# Patient Record
Sex: FEMALE | Employment: FULL TIME | URBAN - METROPOLITAN AREA
[De-identification: names, ages, dates, MRNs, and addresses within clinical notes are randomized per-mention and may not be internally consistent; named-entity substitution may affect disease eponyms.]

---

## 2019-03-07 LAB
HBV SURFACE AG SERPL QL IA: NON REACTIVE
HIV 1+2 AB+HIV1 P24 AG SERPL QL IA: NON REACTIVE
RUBELLA ABY IGG: NORMAL
TREPONEMA ANTIBODIES: NON REACTIVE

## 2019-09-20 LAB — GROUP B STREP PCR: NEGATIVE

## 2019-10-17 ENCOUNTER — HOSPITAL ENCOUNTER (INPATIENT)
Facility: CLINIC | Age: 34
LOS: 2 days | Discharge: HOME | End: 2019-10-20
Attending: OBSTETRICS & GYNECOLOGY | Admitting: OBSTETRICS & GYNECOLOGY
Payer: COMMERCIAL

## 2019-10-17 PROBLEM — Z36.89 ENCOUNTER FOR TRIAGE IN PREGNANT PATIENT: Status: ACTIVE | Noted: 2019-10-17

## 2019-10-17 PROCEDURE — G0463 HOSPITAL OUTPT CLINIC VISIT: HCPCS

## 2019-10-17 RX ORDER — PRENATAL VIT/IRON FUM/FOLIC AC 27MG-0.8MG
1 TABLET ORAL DAILY
COMMUNITY

## 2019-10-17 RX ORDER — CHLORAL HYDRATE 500 MG
2 CAPSULE ORAL DAILY
Status: ON HOLD | COMMUNITY
End: 2019-10-20

## 2019-10-17 RX ORDER — MULTIVIT-MIN/IRON/FOLIC ACID/K 18-600-40
CAPSULE ORAL
Status: ON HOLD | COMMUNITY
End: 2019-10-20

## 2019-10-17 ASSESSMENT — MIFFLIN-ST. JEOR: SCORE: 1482.46

## 2019-10-18 LAB
ABO + RH BLD: NORMAL
ABO + RH BLD: NORMAL
SPECIMEN EXP DATE BLD: NORMAL
T PALLIDUM AB SER QL: NONREACTIVE

## 2019-10-18 PROCEDURE — 36415 COLL VENOUS BLD VENIPUNCTURE: CPT | Performed by: OBSTETRICS & GYNECOLOGY

## 2019-10-18 PROCEDURE — 25000125 ZZHC RX 250: Performed by: OBSTETRICS & GYNECOLOGY

## 2019-10-18 PROCEDURE — 25000128 H RX IP 250 OP 636: Performed by: OBSTETRICS & GYNECOLOGY

## 2019-10-18 PROCEDURE — 86900 BLOOD TYPING SEROLOGIC ABO: CPT | Performed by: OBSTETRICS & GYNECOLOGY

## 2019-10-18 PROCEDURE — 12000000 ZZH R&B MED SURG/OB

## 2019-10-18 PROCEDURE — 86901 BLOOD TYPING SEROLOGIC RH(D): CPT | Performed by: OBSTETRICS & GYNECOLOGY

## 2019-10-18 PROCEDURE — 72200001 ZZH LABOR CARE VAGINAL DELIVERY SINGLE

## 2019-10-18 PROCEDURE — 0KQM0ZZ REPAIR PERINEUM MUSCLE, OPEN APPROACH: ICD-10-PCS | Performed by: OBSTETRICS & GYNECOLOGY

## 2019-10-18 PROCEDURE — 86780 TREPONEMA PALLIDUM: CPT | Performed by: OBSTETRICS & GYNECOLOGY

## 2019-10-18 PROCEDURE — 40000084 ZZH STATISTIC IP LACTATION SERVICES 16-30 MIN

## 2019-10-18 PROCEDURE — 25000132 ZZH RX MED GY IP 250 OP 250 PS 637: Performed by: OBSTETRICS & GYNECOLOGY

## 2019-10-18 RX ORDER — BISACODYL 10 MG
10 SUPPOSITORY, RECTAL RECTAL DAILY PRN
Status: DISCONTINUED | OUTPATIENT
Start: 2019-10-20 | End: 2019-10-20 | Stop reason: HOSPADM

## 2019-10-18 RX ORDER — OXYTOCIN/0.9 % SODIUM CHLORIDE 30/500 ML
100 PLASTIC BAG, INJECTION (ML) INTRAVENOUS CONTINUOUS
Status: DISCONTINUED | OUTPATIENT
Start: 2019-10-18 | End: 2019-10-20 | Stop reason: HOSPADM

## 2019-10-18 RX ORDER — ACETAMINOPHEN 325 MG/1
650 TABLET ORAL EVERY 4 HOURS PRN
Status: DISCONTINUED | OUTPATIENT
Start: 2019-10-18 | End: 2019-10-20 | Stop reason: HOSPADM

## 2019-10-18 RX ORDER — OXYTOCIN 10 [USP'U]/ML
10 INJECTION, SOLUTION INTRAMUSCULAR; INTRAVENOUS
Status: COMPLETED | OUTPATIENT
Start: 2019-10-18 | End: 2019-10-18

## 2019-10-18 RX ORDER — METHYLERGONOVINE MALEATE 0.2 MG/ML
200 INJECTION INTRAVENOUS
Status: COMPLETED | OUTPATIENT
Start: 2019-10-18 | End: 2019-10-18

## 2019-10-18 RX ORDER — FENTANYL CITRATE 50 UG/ML
50-100 INJECTION, SOLUTION INTRAMUSCULAR; INTRAVENOUS
Status: DISCONTINUED | OUTPATIENT
Start: 2019-10-18 | End: 2019-10-18

## 2019-10-18 RX ORDER — HYDROCORTISONE 2.5 %
CREAM (GRAM) TOPICAL 3 TIMES DAILY PRN
Status: DISCONTINUED | OUTPATIENT
Start: 2019-10-18 | End: 2019-10-20 | Stop reason: HOSPADM

## 2019-10-18 RX ORDER — OXYCODONE AND ACETAMINOPHEN 5; 325 MG/1; MG/1
1 TABLET ORAL
Status: DISCONTINUED | OUTPATIENT
Start: 2019-10-18 | End: 2019-10-18

## 2019-10-18 RX ORDER — LANOLIN 100 %
OINTMENT (GRAM) TOPICAL
Status: DISCONTINUED | OUTPATIENT
Start: 2019-10-18 | End: 2019-10-20 | Stop reason: HOSPADM

## 2019-10-18 RX ORDER — ONDANSETRON 2 MG/ML
4 INJECTION INTRAMUSCULAR; INTRAVENOUS EVERY 6 HOURS PRN
Status: DISCONTINUED | OUTPATIENT
Start: 2019-10-18 | End: 2019-10-18

## 2019-10-18 RX ORDER — IBUPROFEN 800 MG/1
800 TABLET, FILM COATED ORAL
Status: COMPLETED | OUTPATIENT
Start: 2019-10-18 | End: 2019-10-18

## 2019-10-18 RX ORDER — CARBOPROST TROMETHAMINE 250 UG/ML
250 INJECTION, SOLUTION INTRAMUSCULAR
Status: DISCONTINUED | OUTPATIENT
Start: 2019-10-18 | End: 2019-10-18

## 2019-10-18 RX ORDER — AMOXICILLIN 250 MG
2 CAPSULE ORAL 2 TIMES DAILY
Status: DISCONTINUED | OUTPATIENT
Start: 2019-10-18 | End: 2019-10-20 | Stop reason: HOSPADM

## 2019-10-18 RX ORDER — OXYTOCIN/0.9 % SODIUM CHLORIDE 30/500 ML
100-340 PLASTIC BAG, INJECTION (ML) INTRAVENOUS CONTINUOUS PRN
Status: DISCONTINUED | OUTPATIENT
Start: 2019-10-18 | End: 2019-10-18

## 2019-10-18 RX ORDER — NALOXONE HYDROCHLORIDE 0.4 MG/ML
.1-.4 INJECTION, SOLUTION INTRAMUSCULAR; INTRAVENOUS; SUBCUTANEOUS
Status: DISCONTINUED | OUTPATIENT
Start: 2019-10-18 | End: 2019-10-20 | Stop reason: HOSPADM

## 2019-10-18 RX ORDER — OXYTOCIN 10 [USP'U]/ML
10 INJECTION, SOLUTION INTRAMUSCULAR; INTRAVENOUS
Status: DISCONTINUED | OUTPATIENT
Start: 2019-10-18 | End: 2019-10-20 | Stop reason: HOSPADM

## 2019-10-18 RX ORDER — OXYTOCIN/0.9 % SODIUM CHLORIDE 30/500 ML
340 PLASTIC BAG, INJECTION (ML) INTRAVENOUS CONTINUOUS PRN
Status: DISCONTINUED | OUTPATIENT
Start: 2019-10-18 | End: 2019-10-20 | Stop reason: HOSPADM

## 2019-10-18 RX ORDER — IBUPROFEN 800 MG/1
800 TABLET, FILM COATED ORAL EVERY 6 HOURS PRN
Status: DISCONTINUED | OUTPATIENT
Start: 2019-10-18 | End: 2019-10-20 | Stop reason: HOSPADM

## 2019-10-18 RX ORDER — MISOPROSTOL 200 UG/1
TABLET ORAL
Status: DISCONTINUED
Start: 2019-10-18 | End: 2019-10-18 | Stop reason: HOSPADM

## 2019-10-18 RX ORDER — SODIUM CHLORIDE, SODIUM LACTATE, POTASSIUM CHLORIDE, CALCIUM CHLORIDE 600; 310; 30; 20 MG/100ML; MG/100ML; MG/100ML; MG/100ML
INJECTION, SOLUTION INTRAVENOUS CONTINUOUS
Status: DISCONTINUED | OUTPATIENT
Start: 2019-10-18 | End: 2019-10-18

## 2019-10-18 RX ORDER — AMOXICILLIN 250 MG
1 CAPSULE ORAL 2 TIMES DAILY
Status: DISCONTINUED | OUTPATIENT
Start: 2019-10-18 | End: 2019-10-20 | Stop reason: HOSPADM

## 2019-10-18 RX ORDER — ACETAMINOPHEN 325 MG/1
650 TABLET ORAL EVERY 4 HOURS PRN
Status: DISCONTINUED | OUTPATIENT
Start: 2019-10-18 | End: 2019-10-18

## 2019-10-18 RX ORDER — PRENATAL VIT/IRON FUM/FOLIC AC 27MG-0.8MG
1 TABLET ORAL DAILY
Status: DISCONTINUED | OUTPATIENT
Start: 2019-10-18 | End: 2019-10-20 | Stop reason: HOSPADM

## 2019-10-18 RX ORDER — NALOXONE HYDROCHLORIDE 0.4 MG/ML
.1-.4 INJECTION, SOLUTION INTRAMUSCULAR; INTRAVENOUS; SUBCUTANEOUS
Status: DISCONTINUED | OUTPATIENT
Start: 2019-10-18 | End: 2019-10-18

## 2019-10-18 RX ADMIN — LIDOCAINE HYDROCHLORIDE 15 ML: 10 INJECTION, SOLUTION EPIDURAL; INFILTRATION; INTRACAUDAL; PERINEURAL at 02:16

## 2019-10-18 RX ADMIN — IBUPROFEN 800 MG: 800 TABLET ORAL at 16:55

## 2019-10-18 RX ADMIN — IBUPROFEN 800 MG: 800 TABLET ORAL at 23:39

## 2019-10-18 RX ADMIN — METHYLERGONOVINE MALEATE 200 MCG: 0.2 INJECTION, SOLUTION INTRAMUSCULAR; INTRAVENOUS at 02:31

## 2019-10-18 RX ADMIN — OXYTOCIN 10 UNITS: 10 INJECTION, SOLUTION INTRAMUSCULAR; INTRAVENOUS at 02:14

## 2019-10-18 RX ADMIN — IBUPROFEN 800 MG: 800 TABLET ORAL at 09:52

## 2019-10-18 RX ADMIN — IBUPROFEN 800 MG: 800 TABLET ORAL at 03:56

## 2019-10-18 RX ADMIN — SENNOSIDES AND DOCUSATE SODIUM 2 TABLET: 8.6; 5 TABLET ORAL at 20:44

## 2019-10-18 RX ADMIN — SENNOSIDES AND DOCUSATE SODIUM 1 TABLET: 8.6; 5 TABLET ORAL at 09:52

## 2019-10-18 NOTE — PLAN OF CARE
Data: Vital signs within normal limits. Postpartum checks within normal limits - see flow record. Patient eating and drinking normally. Patient able to empty bladder independently and is up ambulating. No apparent signs of infection. Patient performing self cares, is able to care for infant and is breastfeeding every 2-3 hours.   Is using ibuprofen for generalized discomfort and cramping, c/o some soreness at stitches site, no bruising or redness noted.  Rested in bed this shift.  Action: Patient medicated during the shift for cramping. See MAR. Patient reassessed within 1 hour after each medication and pain was improved - patient stated she was comfortable. Patient education done, see flow record.  Response: Positive attachment behaviors observed with infant. Patient's support present this shift.   Plan: Continue current plan of care.  Anticipate discharge on 10/20/19.

## 2019-10-18 NOTE — L&D DELIVERY NOTE
OB Vaginal Delivery Note    Hilary May MRN# [id number]   Age: 33 year old YOB: 1985       GA: 40w1d  GP:   Labor Complications: None   Delivery QBL: 307 mL  Delivery Type: Vaginal, Spontaneous   ROM to Delivery Time: (Delivered) Minutes: 19   Weight:      1 Minute 5 Minute 10 Minute   Apgar Totals: 9    9          KORTNEY ACEVES;BARDAI, ROZINA R;ALVIN RENEE     Delivery Details:  Hilary May, a 33 year old  female delivered a viable infant with apgars of 9   and 9  . Patient was fully dilated and pushing after 4  hours 45  minutes in active labor. Delivery was via vaginal, spontaneous  to a sterile field under local  anesthesia. Infant delivered in vertex  right  occiput  anterior  position. Anterior and posterior shoulders delivered without difficulty. The cord was clamped, cut twice and 3 vessels were noted. Cord blood was obtained in routine fashion with the following disposition: lab .      Cord complications: none   Placenta delivered at 10/18/2019  2:14 AM . Placental disposition was Hospital disposal . Fundal massage performed and fundus found to be firm.     Episiotomy: none    Perineum, vagina, cervix were inspected, and the following lacerations were noted:   Perineal lacerations: 2nd . The rectal sphincter capsule was exposure but rectal examination and careful inspection revealed that there was no disruption of this and therefore no third degree laceration was present. The tissue overlying the capsule was reinforced with two figure of X sutures of 2-0 Vicryl. The 2nd degree laceration was then repaired in usual fashion using 3-0 Vicryl.    Patient did have some brisk bleeding during the repair with some uterine atony noted and clot in the lower uterine segment during bimanual exam.  Given this finding she was given a dose of IM Methergine.  Examination at completion of the repair revealed a firm uterus with a light trickle of  bleeding.    Excellent hemostasis was noted. Needle count correct. Infant and patient in delivery room in good and stable condition.        Labor Event Times    Labor onset date:  10/17/19 Onset time:   9:00 PM   Dilation complete date:  10/18/19 Complete time:   1:45 AM   Start pushing date/time:  10/18/2019 0150      Labor Events     labor?:  No   steroids:  None  Labor Type:  Spontaneous     Antibiotics received during labor?:  No     Rupture date/time: 10/18/19 0149   Rupture type:  Spontaneous rupture of membranes occuring during spontaneous labor or augmentation  Fluid color:  Clear  Fluid odor:  Normal     1:1 continuous labor support provided by?:  TOMMIE soto       Delivery/Placenta Date and Time    Delivery Date:  10/18/19 Delivery Time:   2:08 AM   Placenta Date/Time:  10/18/2019  2:14 AM     Vaginal Counts     Initial count performed by 2 team members:   Two Team Members   Dr Hieronimus Rozina B       Needles Suture Fence Sponges Instruments   Initial counts 2  5    Added to count  2     Final counts       Placed during labor Accounted for at the end of labor   NA    NA    NA     Final count performed by 2 team members:   Two Team Members   DR Hieronimus Rozina B             Apgars    Living status:  Living   1 Minute 5 Minute 10 Minute 15 Minute 20 Minute   Skin color: 1  1       Heart rate: 2  2       Reflex irritability: 2  2       Muscle tone: 2  2       Respiratory effort: 2  2       Total: 9  9       Apgars assigned by:  ALVIN RENEE RN     Cord     Complications:  None   Cord Blood Disposition:  Lab Gases Sent?:  No       Resuscitation    Methods:  None       Output in Delivery Room:  Stool     Skin to Skin and Feeding Plan    Skin to skin initiation date/time: 1/10/1841    Skin to skin with:  Mother  Skin to skin end date/time:     How do you plan to feed your baby:  Breastfeeding     Labor Events and Shoulder Dystocia    Fetal Tracing Prior to Delivery:  Category  2  Shoulder dystocia present?:  Neg             Delivery (Maternal) (Provider to Complete) (138276)    Episiotomy:  None  Perineal lacerations:  2nd Repaired?:  Yes      Blood Loss  Mother: Hilary May #[id number]   Start of Mother's Information    IO Blood Loss  10/17/19 2100 - 10/18/19 0258    Delivery QBL (mL) Hospital Encounter 307 mL    Total  307 mL         End of Mother's Information  Mother: Hilary May #[id number]         Delivery - Provider to Complete ([id number])    Delivering clinician:  Marcela Ribera MD  Attempted Delivery Types (Choose all that apply):  Spontaneous Vaginal Delivery  Delivery Type (Choose the 1 that will go to the Birth History):  Vaginal, Spontaneous   Other personnel:   Provider Role   Marcela Ribera MD Obstetrician   Bardai, Rozina R, RN Delivery Nurse   Siomara Jeff, RN Delivery Nurse         Placenta    Delayed Cord Clamping:  Done  Date/Time:  10/18/2019  2:14 AM  Removal:  Spontaneous  Disposition:  Hospital disposal     Anesthesia    Method:  Local          Presentation and Position    Presentation:  Vertex  Position:  Right Occiput Anterior           Marcela Ribera MD

## 2019-10-18 NOTE — PLAN OF CARE
Data: Patient admitted to room 405 at 0130. Patient is a . Prenatal record reviewed.   OB History    Para Term  AB Living   2 1 1 0 0 1   SAB TAB Ectopic Multiple Live Births   0 0 0 0 1      # Outcome Date GA Lbr Theo/2nd Weight Sex Delivery Anes PTL Lv   2 Current            1 Term            .  Medical History:   Past Medical History:   Diagnosis Date     Depressive disorder    .  Gestational age 40w1d. Vital signs per doc flowsheet. Fetal movement present. Patient reports Rule Out Labor   as reason for admission. Support persons Shelton and Patel present.  Action:  Verbal consent for EFM, external fetal monitors applied. Admission assessment completed. Patient and support persons educated on labor process. Patient instructed to report change in fetal movement, contractions, vaginal leaking of fluid or bleeding, abdominal pain, or any concerns related to the pregnancy to her nurse/physician. Patient oriented to room, call light in reach.   Response: Dr. Ribera informed of status of labor. Plan per provider is admit and follow labor orders. Patient verbalized understanding of education and verbalized agreement with plan. Patient coping with labor via breathing/relaxation. Patient made request for possible nitrous oxide use.

## 2019-10-18 NOTE — PROVIDER NOTIFICATION
10/18/19 0114   Provider Notification   Provider Name/Title Dr. Ribera   Method of Notification Phone   Request Evaluate - Remote   Notification Reason SVE;Status Update   Md updated on SVE. Patient making cervical change. Ok to move patient to inpatient. See orders.

## 2019-10-18 NOTE — PROVIDER NOTIFICATION
10/17/19 5207   Provider Notification   Provider Name/Title Dr. Ribera   Method of Notification Phone   Request Evaluate - Remote   Notification Reason Patient Arrived;Labor Status;Uterine Activity;SVE   MD notified of patient's arrival, [facility], [facility], [facility]. OK to observe patient after ambulating to monitor labor status. Order for NST, observation status. See orders

## 2019-10-18 NOTE — PLAN OF CARE
Assumed care at 0500: VSS. Breastfeeding and tolerating well. Light amount of lochia, no clots noted. Ibuprofen and Tylenol available for pain. FOB at bedside and supportive. Bonding well with baby. Will continue to monitor and provide supportive cares.

## 2019-10-18 NOTE — H&P
[facility] Labor and Delivery History and Physical    Hilary May MRN# [id number]   Age: 33 year old YOB: 1985     Date of Admission:  10/17/2019         CC:    Contractions         HPI:   Hilary May is a 33 year old  at 40w1d by 8w0d US who presents with reports of contractions since 3:00 this afternoon.  Patient reports that the contractions became more regular around 9 PM this evening.  Patient denies significant vaginal bleeding or gushes of fluid.  She reports regular fetal movement.  Her pregnancy is complicated by history of anxiety and depression for which she is not currently taking any medication and does not have any current concerns.          Pregnancy history:     OBSTETRIC HISTORY:    OB History    Para Term  AB Living   2 1 1 0 0 1   SAB TAB Ectopic Multiple Live Births   0 0 0 0 1      # Outcome Date GA Lbr Theo/2nd Weight Sex Delivery Anes PTL Lv   2 Current            1 Term                Prenatal Labs:   Blood Type: A+  Rubella: Immune  HIV: Nonreactive  Hep B Surface Ag: Negative  Syphilis: Nonreactive x 2  GCT: Normal  Last Hgb: 12.7 on     GBS Status:   Lab Results   Component Value Date    GBS NEGATIVE 2019       Medication Prior to Admission  Medications Prior to Admission   Medication Sig Dispense Refill Last Dose     fish oil-omega-3 fatty acids 1000 MG capsule Take 2 g by mouth daily   10/16/2019 at Unknown time     Prenatal Vit-Fe Fumarate-FA (PRENATAL MULTIVITAMIN W/IRON) 27-0.8 MG tablet Take 1 tablet by mouth daily   10/16/2019 at Unknown time     Vitamin D, Cholecalciferol, 1000 units TABS    10/16/2019 at Unknown time   .        Maternal Past Medical History:     Past Medical History:   Diagnosis Date     Depressive disorder                        Family History:   History reviewed. No pertinent family history.         Social History:     Social History     Tobacco Use     Smoking status: Never Smoker      "Smokeless tobacco: Never Used   Substance Use Topics     Alcohol use: Not Currently     Drug use: Never            Review of Systems:   Negative except as noted above in the HPI         Physical Exam:     Vitals:    10/17/19 2233   BP: 113/86   Pulse: 102   Resp: 18   Temp: 98.4  F (36.9  C)   TempSrc: Oral   Weight: 73.5 kg (162 lb)   Height: 1.702 m (5' 7\")     Cardio: Regular rate and rhythm  Resp: Clear to auscultation bilaterally  Abdomen: gravid, soft, nt  Cervix: 4/80/-1 per RN exam  Presentation:Cephalic by SVE  Membranes: Intact  Fetal Heart Rate Tracin, moderate, no decelerations  Tocometer: Every 3 to 10 minutes        Assessment:   Hilary May is a 33 year old  at 40w1d by 8w0d US admitted for labor.  Pregnancy complicated by anxiety and depression.        Plan:   Labor: Spontaneous progress.  Anticipate .  Fetal status: Category 1  GBS: negative  Pain management: Comfort measures per patient preference    Ashley Hieronimus, MD [facility]  10/18/2019 3:03 AM                            "

## 2019-10-18 NOTE — PLAN OF CARE
Data: Hilray May transferred to [facility] via wheelchair at 0515. Baby transferred via parent's arms.  Action: Receiving unit notified of transfer: Yes. Patient and family notified of room change. Report given to Emy REILLY at 0520. Belongings sent to receiving unit. Accompanied by Registered Nurse. Oriented patient to surroundings. Call light within reach. ID bands double-checked with receiving RN.  Response: Patient tolerated transfer and is stable.    Patients mobililty level scored using the bedside mobility assistance tool (BMAT). Patient is at a mobility level test number: 4. Mobility equipment used: none required. Required assist of 0 staff members. Further use of BMAT scoring not required.

## 2019-10-18 NOTE — LACTATION NOTE
Lactation in to see patient. Mother states baby sleepy and having attempts. Nursed well first couple of times. Writer changed diaper and baby awake. Baby latched with swallows heard. Baby does tuck bottom lip in needing to be readjusted through out feed. All questions answered at this time. Encouraged to call for help prn.

## 2019-10-19 LAB — HGB BLD-MCNC: 12.6 G/DL (ref 11.7–15.7)

## 2019-10-19 PROCEDURE — 36415 COLL VENOUS BLD VENIPUNCTURE: CPT | Performed by: OBSTETRICS & GYNECOLOGY

## 2019-10-19 PROCEDURE — 12000000 ZZH R&B MED SURG/OB

## 2019-10-19 PROCEDURE — 25000132 ZZH RX MED GY IP 250 OP 250 PS 637: Performed by: OBSTETRICS & GYNECOLOGY

## 2019-10-19 PROCEDURE — 85018 HEMOGLOBIN: CPT | Performed by: OBSTETRICS & GYNECOLOGY

## 2019-10-19 RX ADMIN — IBUPROFEN 800 MG: 800 TABLET ORAL at 21:42

## 2019-10-19 RX ADMIN — IBUPROFEN 800 MG: 800 TABLET ORAL at 07:55

## 2019-10-19 RX ADMIN — SENNOSIDES AND DOCUSATE SODIUM 1 TABLET: 8.6; 5 TABLET ORAL at 20:26

## 2019-10-19 RX ADMIN — PRENATAL VIT W/ FE FUMARATE-FA TAB 27-0.8 MG 1 TABLET: 27-0.8 TAB at 10:25

## 2019-10-19 RX ADMIN — SENNOSIDES AND DOCUSATE SODIUM 1 TABLET: 8.6; 5 TABLET ORAL at 07:56

## 2019-10-19 NOTE — PLAN OF CARE
Pt able to get some rest between cares w/  rooming-in for part of night.  States ordered pain medications keeping her comfortable.  Independent w/ self cares.  FOB helpful w/  cares.

## 2019-10-19 NOTE — PLAN OF CARE
Patient meeting expected goals. Bonding well with . Using ibuprofen and heat for cramping. Ambulating and voiding without difficulty. Education taught to patient and patient verbalized understanding.

## 2019-10-19 NOTE — PROGRESS NOTES
"[facility] Postpartum Note    S:  Hilary May feels good this morning. Was able to sleep last night. Pain control adequate. Lochia minimal. Voiding. Breast feeding. Mood Good.     O:  Vitals were reviewed  Blood pressure 111/59, pulse 76, temperature 98  F (36.7  C), temperature source Oral, resp. rate 18, height 1.702 m (5' 7\"), weight 73.5 kg (162 lb), last menstrual period 01/10/2019, unknown if currently breastfeeding.      General: healthy, alert and no distress  Abd: soft, appropriately tender, fundus firm  Legs: Non-tender, 0+ pitting edema    RH(D)   Date Value Ref Range Status   10/18/2019 Pos  Final       Assessment and Plan:   Postpartum Day #1, status post vaginal delivery, doing well.  -- Routine care      Anu Espinoza, , DO    "

## 2019-10-19 NOTE — PLAN OF CARE
Pt bonding well with baby. Breastfeeding with good latch, some nipple tenderness reported, mothers love and hydrogel pads given. Denies headache, SOB, chest pain, dizziness, and changes in vision. Ambulating independently, voiding without difficulty. Ibuprofen for pain management. Hemorrhoids present, tucks pads in place.     Tennille Olivas RN on 10/19/2019 at 2:00 PM

## 2019-10-20 VITALS
SYSTOLIC BLOOD PRESSURE: 108 MMHG | RESPIRATION RATE: 16 BRPM | DIASTOLIC BLOOD PRESSURE: 60 MMHG | BODY MASS INDEX: 25.43 KG/M2 | WEIGHT: 162 LBS | TEMPERATURE: 97.5 F | HEIGHT: 67 IN | HEART RATE: 76 BPM

## 2019-10-20 PROCEDURE — 25000132 ZZH RX MED GY IP 250 OP 250 PS 637: Performed by: OBSTETRICS & GYNECOLOGY

## 2019-10-20 RX ADMIN — SENNOSIDES AND DOCUSATE SODIUM 2 TABLET: 8.6; 5 TABLET ORAL at 10:08

## 2019-10-20 RX ADMIN — PRENATAL VIT W/ FE FUMARATE-FA TAB 27-0.8 MG 1 TABLET: 27-0.8 TAB at 10:07

## 2019-10-20 NOTE — PROGRESS NOTES
[facility] Obstetrics Post-Partum Progress Note          Assessment and Plan:    Assessment:   Post-partum day #2  Normal spontaneous vaginal delivery  L&D complications: None      Doing well.  No excessive bleeding  Pain well-controlled.      Plan:   Discharge later today           Interval History:   Doing well.  Pain is adequately controlled.  No fevers.  No history of foul-smelling vaginal discharge.  Good appetite.  Denies chest pain, shortness of breath, nausea or vomiting.  Vaginal bleeding is similar to a heavy menstrual flow.  Breastfeeding well.          Significant Problems:    None          Review of Systems:    The patient denies any chest pain, shortness of breath, excessive pain, fever, chills, purulent drainage from the wound, nausea or vomiting.          Medications:   All medications  have been reviewed          Physical Exam:   All vitals stable  Uterine fundus is firm, non-tender and at the level of the umbilicus          Data:   All laboratory data reviewed      Jorge Christianson

## 2019-10-20 NOTE — PLAN OF CARE
All discharge instructions reviewed. Patient verbalizes understanding of instructions and all questions answered.

## 2019-10-20 NOTE — PLAN OF CARE
Vitals stable. Ibuprofen given this shift, pain well controlled. Independent with self and baby cares.  Shelton at bedside and supportive. Planning to discharge to home tomorrow.

## 2019-10-20 NOTE — PLAN OF CARE
Pt able to get some rest between cares w/  rooming-in for night.  Gel pads for nipple tenderness and tucks pads to hemorrhoid.  Independent w/ self cares; spouse assisting w/  cares.  Anticipate discharge later today.

## 2019-10-20 NOTE — DISCHARGE SUMMARY
[facility] Discharge Summary    Hilary May MRN# [id number]   Age: 33 year old YOB: 1985     Date of Admission:  10/17/2019  Date of Discharge::  10/20/2019  Admitting Physician:  Marcela Ribera MD  Discharge Physician:  Jorge Christianson     [place] [facility]: [facility]          Admission Diagnoses:    pregnancy  Encounter for triage in pregnant patient  Labor and delivery indication for care or intervention   (spontaneous vaginal delivery)          Discharge Diagnosis:   Normal spontaneous vaginal delivery  Intrauterine pregnancy at 40 weeks gestation          Procedures:   Procedure(s): No additional procedures performed                  Medications Prior to Admission:     Medications Prior to Admission   Medication Sig Dispense Refill Last Dose     fish oil-omega-3 fatty acids 1000 MG capsule Take 2 g by mouth daily   10/16/2019 at Unknown time     Prenatal Vit-Fe Fumarate-FA (PRENATAL MULTIVITAMIN W/IRON) 27-0.8 MG tablet Take 1 tablet by mouth daily   10/16/2019 at Unknown time     Vitamin D, Cholecalciferol, 1000 units TABS    10/16/2019 at Unknown time             Discharge Medications:   No medications were prescribed on discharge          Consultations:   No consultations were requested during this admission          Brief History of Labor:   Hilary May, a 33 year old  female delivered a viable infant with apgars of 9   and 9  . Patient was fully dilated and pushing after 4  hours 45  minutes in active labor. Delivery was via vaginal, spontaneous  to a sterile field under local  anesthesia. Infant delivered in vertex  right  occiput  anterior  position. Anterior and posterior shoulders delivered without difficulty. The cord was clamped, cut twice and 3 vessels were noted. Cord blood was obtained in routine fashion with the following disposition: lab .       Cord complications: none   Placenta delivered at 10/18/2019  2:14 AM . Placental disposition was  Hospital disposal . Fundal massage performed and fundus found to be firm.      Episiotomy: none    Perineum, vagina, cervix were inspected, and the following lacerations were noted:   Perineal lacerations: 2nd . The rectal sphincter capsule was exposure but rectal examination and careful inspection revealed that there was no disruption of this and therefore no third degree laceration was present. The tissue overlying the capsule was reinforced with two figure of X sutures of 2-0 Vicryl. The 2nd degree laceration was then repaired in usual fashion using 3-0 Vicryl.     Patient did have some brisk bleeding during the repair with some uterine atony noted and clot in the lower uterine segment during bimanual exam.  Given this finding she was given a dose of IM Methergine.  Examination at completion of the repair revealed a firm uterus with a light trickle of bleeding.     Excellent hemostasis was noted. Needle count correct. Infant and patient in delivery room in good and stable condition.            Hospital Course:   The patient's hospital course was unremarkable.  On discharge, her pain was well controlled. Vaginal bleeding is similar to peak menstrual flow.  Voiding without difficulty.  Ambulating well and tolerating a normal diet.  No fever.  Breastfeeding well.  Infant is stable.  No bowel movement yet.*  She was discharged on post-partum day #2.    Post-partum hemoglobin:   Hemoglobin   Date Value Ref Range Status   10/19/2019 12.6 11.7 - 15.7 g/dL Final             Discharge Instructions and Follow-Up:   Discharge diet: Regular   Discharge activity: Activity as tolerated   Discharge follow-up: Follow up with primary care provider in 6 weeks   Wound care:            Discharge Disposition:   Discharged to home      Attestation:  I have reviewed today's vital signs, notes, medications, labs and imaging.    Jorge Christianson

## 2019-10-20 NOTE — PLAN OF CARE
Data: Vital signs within normal limits. Postpartum checks within normal limits - see flow record. Patient eating and drinking normally. Patient able to empty bladder independently and is up ambulating. No apparent signs of infection. Patient performing self cares, is able to care for infant and is breastfeeding every 2-3 hours.  Perineum  appears within normal. Has not used pain medications this shift. Up ad amor.   Action: Patient not medicated during the shift for pain and cramping.  Patient stated she was comfortable. Patient education done, see flow record.  Response: Bonding attachment behaviors observed with infant. Patient's spouse present this shift.   Plan: Continue current plan of care.  Anticipate discharge today.